# Patient Record
Sex: FEMALE
[De-identification: names, ages, dates, MRNs, and addresses within clinical notes are randomized per-mention and may not be internally consistent; named-entity substitution may affect disease eponyms.]

---

## 2020-01-30 PROBLEM — Z00.00 ENCOUNTER FOR PREVENTIVE HEALTH EXAMINATION: Status: ACTIVE | Noted: 2020-01-30

## 2020-02-05 ENCOUNTER — APPOINTMENT (OUTPATIENT)
Dept: BARIATRICS | Facility: CLINIC | Age: 50
End: 2020-02-05
Payer: COMMERCIAL

## 2020-02-05 VITALS
DIASTOLIC BLOOD PRESSURE: 77 MMHG | HEART RATE: 70 BPM | SYSTOLIC BLOOD PRESSURE: 122 MMHG | BODY MASS INDEX: 35.36 KG/M2 | OXYGEN SATURATION: 100 % | WEIGHT: 220 LBS | HEIGHT: 66 IN | TEMPERATURE: 98 F

## 2020-02-05 DIAGNOSIS — M47.816 SPONDYLOSIS W/OUT MYELOPATHY OR RADICULOPATHY, LUMBAR REGION: ICD-10-CM

## 2020-02-05 DIAGNOSIS — K91.2 POSTSURGICAL MALABSORPTION, NOT ELSEWHERE CLASSIFIED: ICD-10-CM

## 2020-02-05 PROCEDURE — 99203 OFFICE O/P NEW LOW 30 MIN: CPT

## 2020-02-19 ENCOUNTER — APPOINTMENT (OUTPATIENT)
Dept: BARIATRICS | Facility: CLINIC | Age: 50
End: 2020-02-19
Payer: COMMERCIAL

## 2020-02-19 VITALS — HEIGHT: 62 IN | BODY MASS INDEX: 40.72 KG/M2 | WEIGHT: 221.25 LBS

## 2020-02-19 PROCEDURE — 97802 MEDICAL NUTRITION INDIV IN: CPT

## 2020-02-26 ENCOUNTER — APPOINTMENT (OUTPATIENT)
Dept: BARIATRICS | Facility: CLINIC | Age: 50
End: 2020-02-26

## 2020-06-08 ENCOUNTER — APPOINTMENT (OUTPATIENT)
Dept: BARIATRICS | Facility: CLINIC | Age: 50
End: 2020-06-08
Payer: COMMERCIAL

## 2020-06-08 PROCEDURE — 98968 PH1 ASSMT&MGMT NQHP 21-30: CPT

## 2020-06-08 PROCEDURE — 97803 MED NUTRITION INDIV SUBSEQ: CPT

## 2020-07-02 ENCOUNTER — APPOINTMENT (OUTPATIENT)
Dept: RADIOLOGY | Facility: HOSPITAL | Age: 50
End: 2020-07-02

## 2021-03-03 ENCOUNTER — NON-APPOINTMENT (OUTPATIENT)
Age: 51
End: 2021-03-03

## 2021-03-11 ENCOUNTER — APPOINTMENT (OUTPATIENT)
Dept: BARIATRICS | Facility: CLINIC | Age: 51
End: 2021-03-11
Payer: COMMERCIAL

## 2021-03-11 VITALS — BODY MASS INDEX: 40.24 KG/M2 | WEIGHT: 220 LBS

## 2021-03-11 DIAGNOSIS — E66.01 MORBID (SEVERE) OBESITY DUE TO EXCESS CALORIES: ICD-10-CM

## 2021-03-11 DIAGNOSIS — Z98.84 BARIATRIC SURGERY STATUS: ICD-10-CM

## 2021-03-11 PROCEDURE — 99442: CPT

## 2021-03-23 PROBLEM — E66.01 MORBID OBESITY DUE TO EXCESS CALORIES: Status: ACTIVE | Noted: 2020-02-05

## 2021-03-23 PROBLEM — Z98.84 HISTORY OF ROUX-EN-Y GASTRIC BYPASS: Status: ACTIVE | Noted: 2020-02-05

## 2021-03-23 NOTE — HISTORY OF PRESENT ILLNESS
[de-identified] : Patient is a 49 year old female who is s/p a laparoscopic gastric bypass in the [place] in 2011. She states that she did not lose much weight rom that procedure and has regained a significant amount. She states that she did not get any guidance after her operation regarding diet or exercise. She has a BMI of 40.2 and weight-related comorbidities including sever low back pain.

## 2021-03-23 NOTE — HISTORY OF PRESENT ILLNESS
[de-identified] : Patient is a 49 year old female who is s/p a laparoscopic gastric bypass in the [place] in 2011. She states that she did not lose much weight rom that procedure and has regained a significant amount. She states that she did not get any guidance after her operation regarding diet or exercise. She has a BMI of 40.2 and weight-related comorbidities including sever low back pain.

## 2021-03-23 NOTE — ASSESSMENT
[FreeTextEntry1] : She meets the NIH criteria for bariatric surgery and would like to have a revision of her gastric bypass to a laparoscopic modified duodenal switch. I have reviewed the risks and benefits of the procedure with the patient, and she understands this information fully.

## 2021-03-23 NOTE — ASSESSMENT
[FreeTextEntry1] : She meets the NIH criteria for bariatric surgery and would like to have a laparoscopic conversion from a gastric bypass to a modified duodenal switch. I have reviewed the risk and benefits of the procedure with the patient, and she understands this information fully.

## 2021-03-23 NOTE — REASON FOR VISIT
[Initial Consult] : an initial consult for [Follow-Up Visit] : a follow-up visit for [Morbid Obesity (BMI>40)] : morbid obesity (bmi>40) [S/P Bariatric Surgery] : s/p bariatric surgery

## 2021-03-23 NOTE — HISTORY OF PRESENT ILLNESS
[Home] : at home, [unfilled] , at the time of the visit. [Other Location: e.g. Home (Enter Location, City,State)___] : at [unfilled] [Verbal consent obtained from patient] : the patient, [unfilled] [de-identified] : Patient is a 49 year old female who is s/p a laparoscopic gastric bypass in the [place] in 2011. She states that she did not lose much weight from that procedure and has regained a significant amount. She states that she did not get any guidance after her operation regarding diet or exercise. She has a BMI of 40.2 and weight-related comorbidities including severe low back pain.

## 2021-04-21 ENCOUNTER — NON-APPOINTMENT (OUTPATIENT)
Age: 51
End: 2021-04-21